# Patient Record
Sex: MALE | Race: ASIAN | NOT HISPANIC OR LATINO | URBAN - METROPOLITAN AREA
[De-identification: names, ages, dates, MRNs, and addresses within clinical notes are randomized per-mention and may not be internally consistent; named-entity substitution may affect disease eponyms.]

---

## 2019-03-08 ENCOUNTER — EMERGENCY (EMERGENCY)
Facility: HOSPITAL | Age: 69
LOS: 1 days | End: 2019-03-08
Attending: EMERGENCY MEDICINE
Payer: MEDICAID

## 2019-03-08 VITALS
TEMPERATURE: 98 F | DIASTOLIC BLOOD PRESSURE: 82 MMHG | HEART RATE: 79 BPM | OXYGEN SATURATION: 99 % | RESPIRATION RATE: 18 BRPM | SYSTOLIC BLOOD PRESSURE: 130 MMHG

## 2019-03-08 VITALS
RESPIRATION RATE: 20 BRPM | DIASTOLIC BLOOD PRESSURE: 77 MMHG | HEART RATE: 79 BPM | SYSTOLIC BLOOD PRESSURE: 117 MMHG | OXYGEN SATURATION: 100 %

## 2019-03-08 LAB
ALBUMIN SERPL ELPH-MCNC: 3.2 G/DL — LOW (ref 3.3–5)
ALP SERPL-CCNC: 110 U/L — SIGNIFICANT CHANGE UP (ref 40–120)
ALT FLD-CCNC: 116 U/L — HIGH (ref 10–45)
ANION GAP SERPL CALC-SCNC: 14 MMOL/L — SIGNIFICANT CHANGE UP (ref 5–17)
APTT BLD: 26.8 SEC — LOW (ref 27.5–36.3)
AST SERPL-CCNC: 257 U/L — HIGH (ref 10–40)
BILIRUB SERPL-MCNC: 0.5 MG/DL — SIGNIFICANT CHANGE UP (ref 0.2–1.2)
BUN SERPL-MCNC: 18 MG/DL — SIGNIFICANT CHANGE UP (ref 7–23)
CALCIUM SERPL-MCNC: 8.7 MG/DL — SIGNIFICANT CHANGE UP (ref 8.4–10.5)
CHLORIDE SERPL-SCNC: 101 MMOL/L — SIGNIFICANT CHANGE UP (ref 96–108)
CO2 SERPL-SCNC: 22 MMOL/L — SIGNIFICANT CHANGE UP (ref 22–31)
CREAT SERPL-MCNC: 0.96 MG/DL — SIGNIFICANT CHANGE UP (ref 0.5–1.3)
GAS PNL BLDV: SIGNIFICANT CHANGE UP
GAS PNL BLDV: SIGNIFICANT CHANGE UP
GLUCOSE SERPL-MCNC: 133 MG/DL — HIGH (ref 70–99)
HCT VFR BLD CALC: 34.2 % — LOW (ref 39–50)
HGB BLD-MCNC: 11.6 G/DL — LOW (ref 13–17)
INR BLD: 1.12 RATIO — SIGNIFICANT CHANGE UP (ref 0.88–1.16)
MCHC RBC-ENTMCNC: 32.4 PG — SIGNIFICANT CHANGE UP (ref 27–34)
MCHC RBC-ENTMCNC: 34 GM/DL — SIGNIFICANT CHANGE UP (ref 32–36)
MCV RBC AUTO: 95.3 FL — SIGNIFICANT CHANGE UP (ref 80–100)
PLATELET # BLD AUTO: 149 K/UL — LOW (ref 150–400)
POTASSIUM SERPL-MCNC: 3.9 MMOL/L — SIGNIFICANT CHANGE UP (ref 3.5–5.3)
POTASSIUM SERPL-SCNC: 3.9 MMOL/L — SIGNIFICANT CHANGE UP (ref 3.5–5.3)
PROT SERPL-MCNC: 6.1 G/DL — SIGNIFICANT CHANGE UP (ref 6–8.3)
PROTHROM AB SERPL-ACNC: 12.9 SEC — SIGNIFICANT CHANGE UP (ref 10–12.9)
RBC # BLD: 3.59 M/UL — LOW (ref 4.2–5.8)
RBC # FLD: 13.3 % — SIGNIFICANT CHANGE UP (ref 10.3–14.5)
SODIUM SERPL-SCNC: 137 MMOL/L — SIGNIFICANT CHANGE UP (ref 135–145)
TROPONIN T, HIGH SENSITIVITY RESULT: 8 NG/L — SIGNIFICANT CHANGE UP (ref 0–51)
TROPONIN T, HIGH SENSITIVITY RESULT: 8 NG/L — SIGNIFICANT CHANGE UP (ref 0–51)
WBC # BLD: 8.7 K/UL — SIGNIFICANT CHANGE UP (ref 3.8–10.5)
WBC # FLD AUTO: 8.7 K/UL — SIGNIFICANT CHANGE UP (ref 3.8–10.5)

## 2019-03-08 PROCEDURE — 80053 COMPREHEN METABOLIC PANEL: CPT

## 2019-03-08 PROCEDURE — 85014 HEMATOCRIT: CPT

## 2019-03-08 PROCEDURE — 82435 ASSAY OF BLOOD CHLORIDE: CPT

## 2019-03-08 PROCEDURE — 82947 ASSAY GLUCOSE BLOOD QUANT: CPT

## 2019-03-08 PROCEDURE — 84132 ASSAY OF SERUM POTASSIUM: CPT

## 2019-03-08 PROCEDURE — 85730 THROMBOPLASTIN TIME PARTIAL: CPT

## 2019-03-08 PROCEDURE — 82330 ASSAY OF CALCIUM: CPT

## 2019-03-08 PROCEDURE — 85027 COMPLETE CBC AUTOMATED: CPT

## 2019-03-08 PROCEDURE — 99285 EMERGENCY DEPT VISIT HI MDM: CPT | Mod: 25

## 2019-03-08 PROCEDURE — 71275 CT ANGIOGRAPHY CHEST: CPT | Mod: 26

## 2019-03-08 PROCEDURE — 84295 ASSAY OF SERUM SODIUM: CPT

## 2019-03-08 PROCEDURE — 83605 ASSAY OF LACTIC ACID: CPT

## 2019-03-08 PROCEDURE — 93005 ELECTROCARDIOGRAM TRACING: CPT

## 2019-03-08 PROCEDURE — 99291 CRITICAL CARE FIRST HOUR: CPT

## 2019-03-08 PROCEDURE — 85610 PROTHROMBIN TIME: CPT

## 2019-03-08 PROCEDURE — 93010 ELECTROCARDIOGRAM REPORT: CPT

## 2019-03-08 PROCEDURE — 82803 BLOOD GASES ANY COMBINATION: CPT

## 2019-03-08 PROCEDURE — 71275 CT ANGIOGRAPHY CHEST: CPT

## 2019-03-08 PROCEDURE — 71045 X-RAY EXAM CHEST 1 VIEW: CPT | Mod: 26

## 2019-03-08 PROCEDURE — 71045 X-RAY EXAM CHEST 1 VIEW: CPT

## 2019-03-08 PROCEDURE — 84484 ASSAY OF TROPONIN QUANT: CPT

## 2019-03-08 RX ORDER — SODIUM CHLORIDE 9 MG/ML
500 INJECTION, SOLUTION INTRAVENOUS ONCE
Qty: 0 | Refills: 0 | Status: COMPLETED | OUTPATIENT
Start: 2019-03-08 | End: 2019-03-08

## 2019-03-08 RX ORDER — ASPIRIN/CALCIUM CARB/MAGNESIUM 324 MG
162 TABLET ORAL ONCE
Qty: 0 | Refills: 0 | Status: COMPLETED | OUTPATIENT
Start: 2019-03-08 | End: 2019-03-08

## 2019-03-08 RX ADMIN — SODIUM CHLORIDE 500 MILLILITER(S): 9 INJECTION, SOLUTION INTRAVENOUS at 18:24

## 2019-03-08 RX ADMIN — Medication 162 MILLIGRAM(S): at 16:00

## 2019-03-08 NOTE — ED PROVIDER NOTE - CRITICAL CARE PROVIDED
consult w/ pt's family directly relating to pts condition/telephone consultation with the patient's family/interpretation of diagnostic studies/additional history taking/consultation with other physicians/conducted a detailed discussion of DNR status/direct patient care (not related to procedure)/documentation

## 2019-03-08 NOTE — ED ADULT NURSE REASSESSMENT NOTE - NS ED NURSE REASSESS COMMENT FT1
pt reassessed, vitals in flowsheet. pt states no longer has chest pain but GERD like symptoms. MD grissom notified and MD states CT shows possible cardiac tamponade, and advised pt go into room, for US.

## 2019-03-08 NOTE — ED PROVIDER NOTE - CARE PLAN
Principal Discharge DX:	Pericardial effusion  Secondary Diagnosis:	Pleural effusion  Secondary Diagnosis:	SOB (shortness of breath)  Secondary Diagnosis:	Chest pain

## 2019-03-08 NOTE — ED ADULT NURSE REASSESSMENT NOTE - NS ED NURSE REASSESS COMMENT FT1
VBG drawn from pt and MD grissom notified RN that pt is going to be transferred back to [place] where his surgery was performed.

## 2019-03-08 NOTE — ED PROVIDER NOTE - ATTENDING CONTRIBUTION TO CARE
I have seen and evaluated this patient with the resident.   I agree with the findings  unless other wise stated.  I have made appropriate changes in documentations where needed, After my face to face bedside evaluation, I am further  noting: Pt with gradual worsening sob and chest pain since CABG worse today no diaphoresis no fever no syncope Denies orthopnea s/p CABG 2 weeks ago Alert anxious mild resp distress JVD+ marked reduction in air entry Left side heart regular s1s2 abdomen soft non palpable liver pedal edema +moves all extremities  concern for pleural pericardial effusion pulmonary embolism CHF will do stabilaization oxygenation mantainance  labs trop  cxr CTA chest admission --Woo

## 2019-03-08 NOTE — ED PROVIDER NOTE - OBJECTIVE STATEMENT
68 year-old male with history of CAD with recent bypass approx. 2 weeks ago in [facility], diabetes, prior smoker presents to the Emergency Department for chest pain and shortness of breath.  Patient mention ongoing sharp chest pain since surgery; this AM pain was much worse and accompanied with shortness of breath.  + cough, + sputum production.  No fevers, chills, nausea, vomiting, diaphoresis, LE swelling   ID: [id number] 68 year-old male with history of CAD with recent bypass approx. 2 weeks ago in [facility], diabetes, prior smoker presents to the Emergency Department for chest pain and shortness of breath.  Patient mention ongoing sharp chest pain since surgery; this AM pain was much worse and accompanied with shortness of breath.  + cough, + sputum production.  No fevers, chills, nausea, vomiting, diaphoresis, LE swelling   ID: [id number]  Cardiac surgeon - Koki Guillen

## 2019-03-08 NOTE — ED PROVIDER NOTE - PHYSICAL EXAMINATION
*Gen: appears fatigued, AAO*3  *HEENT: NC/AT, MMM, airway patent, trachea midline  *CV: RRR, S1/S2 present, no murmurs/rubs/gallops  *Resp: no respiratory distress, + crackles in AMANUEL; rest lung fields CTAB  *Abd: non-distended, soft N/Tx4, no guarding or rigidity  *Neuro: no focal neuro deficits  *Extremities: no gross deformity  *Skin: no rashes, + surgical scar on central chest  ~ Payton Encinas M.D. *Gen: appears fatigued, AAO*3  *HEENT: NC/AT, MMM, airway patent, trachea midline  *CV: RRR, S1/S2 present, no murmurs/rubs/gallops  *Resp: no respiratory distress, + crackles in left lung; CTAB in right lung  *Abd: non-distended, soft N/Tx4, no guarding or rigidity  *Neuro: no focal neuro deficits  *Extremities: no gross deformity  *Skin: no rashes, + surgical scar on central chest  ~ Payton Encinas M.D.

## 2019-03-08 NOTE — ED PROVIDER NOTE - NSPREEXISTRELATION_ED_A_ED_FT
[facility] - prior CABG done at facility < 1 month ago presenting with CP and SOB found to have significant pleural effusion and pericardial effusion

## 2019-03-08 NOTE — ED PROVIDER NOTE - CLINICAL SUMMARY MEDICAL DECISION MAKING FREE TEXT BOX
Pt with gradual worsening sob and chest pain since CABG worse today no diaphoresis no fever no syncope Denies orthopnea s/p CABG 2 weeks ago Alert anxious mild resp distress JVD+ marked reduction in air entry Left side heart regular s1s2 abdomen soft non palpable liver pedal edema +moves all extremities  concern for pleural pericardial effusion pulmonary embolism CHF will do stabilaization oxygenation mantainance  labs trop  cxr CTA chest admission --Woo

## 2019-03-08 NOTE — ED PROVIDER NOTE - PROGRESS NOTE DETAILS
pt signed out to me by alexis, pending labs, cta chest, discussion with pt's ct surgeon. had contacted jena hudson with [facility] ct surgery, and was awaiting callback. I just received a call pt signed out to me by alexis, pending labs, cta chest, discussion with pt's ct surgeon. had contacted jena hudson with [facility] ct surgery, and was awaiting callback. I just received a call from [facility] regarding cta read. pt has large loculated L pleural effusion, also has large pericardial effusino with some signs of early tamponade. pt re-evaluated, rechecked vitals, without clinical findings indicating tamponade at this time. called back jena hudson with [facility] ct surgery ([phone]), he is attempting to reach ct surgery fellow as patient will require transfer to [facility]. will monitor closely. - Teryr Brasher MD pt accepted as transfer to [facility], accepting physician dr mariano. spoke to [facility] transfer center, who is working on making bed available, transfer center will call back when available. - Terry Brasher MD pt assigned room in CCU at [address] room 13.  contacting ems to arrange for transport. - Terry Brasher MD

## 2019-03-08 NOTE — ED ADULT NURSE REASSESSMENT NOTE - NS ED NURSE REASSESS COMMENT FT1
RN called [facility] transfer center and spoke with DELFIN Pace RN confirmed pt going to CCU 13 in 10 east at [facility].

## 2019-03-08 NOTE — ED ADULT NURSE NOTE - OBJECTIVE STATEMENT
Patient is a 68 y male presenting to the ED via EMS with c/o SOB and CP. Reports sudden SOB and severe chest pain that began this morning. Pt rates chest pain 8/10 states its like an "electrical pain." Chest pain does not radiate. Pt denies N/V/D. PMH DM, ACS. No allergies. After surgery, chest pain continued. Triple bypass CABG 2 weeks ago. Pt reports "severe hives" after surgery. Lipitor and medication for arrythmia Patient is a 68 y male presenting to the ED via EMS with c/o SOB and CP. Pt A&Ox4. Pt is German speaking.  used, Toan ID# [id number]. Pt reports sudden SOB and severe chest pain that began this morning. Pt rates chest pain 8/10 states its like an "electrical pain." Chest pain does not radiate. Pt denies N/V/D. PMH DM, ACS, hyperlipidemia. Pt had triple bypass CABG 2 weeks ago. After surgery, chest pain continued. Pt reports "severe hives" after surgery. EKG performed which showed NSR and placed on CM. Pt in no acute distress. Patient's family at bedside.

## 2019-03-08 NOTE — ED PROVIDER NOTE - CHIEF COMPLAINT
The patient is a 68y Male complaining of chest pain. The patient is a 68y Male complaining of chest pain. Info thro  video device --Woo

## 2019-03-12 DIAGNOSIS — Z95.1 PRESENCE OF AORTOCORONARY BYPASS GRAFT: Chronic | ICD-10-CM

## 2024-10-27 ENCOUNTER — EMERGENCY (EMERGENCY)
Facility: HOSPITAL | Age: 74
LOS: 1 days | Discharge: HOME | End: 2024-10-27
Attending: EMERGENCY MEDICINE
Payer: MEDICAID

## 2024-10-27 VITALS
OXYGEN SATURATION: 97 % | HEART RATE: 60 BPM | WEIGHT: 160.06 LBS | DIASTOLIC BLOOD PRESSURE: 76 MMHG | TEMPERATURE: 98 F | SYSTOLIC BLOOD PRESSURE: 149 MMHG | RESPIRATION RATE: 18 BRPM | HEIGHT: 66.14 IN

## 2024-10-27 VITALS
HEART RATE: 68 BPM | DIASTOLIC BLOOD PRESSURE: 74 MMHG | OXYGEN SATURATION: 100 % | SYSTOLIC BLOOD PRESSURE: 127 MMHG | RESPIRATION RATE: 16 BRPM | TEMPERATURE: 98 F

## 2024-10-27 DIAGNOSIS — Z95.1 PRESENCE OF AORTOCORONARY BYPASS GRAFT: Chronic | ICD-10-CM

## 2024-10-27 PROBLEM — E11.9 TYPE 2 DIABETES MELLITUS WITHOUT COMPLICATIONS: Chronic | Status: ACTIVE | Noted: 2019-03-08

## 2024-10-27 PROCEDURE — 30901 CONTROL OF NOSEBLEED: CPT | Mod: RT

## 2024-10-27 PROCEDURE — 99284 EMERGENCY DEPT VISIT MOD MDM: CPT | Mod: 25

## 2024-10-27 RX ORDER — SILVER NITRATE
1 CRYSTALS MISCELLANEOUS ONCE
Refills: 0 | Status: COMPLETED | OUTPATIENT
Start: 2024-10-27 | End: 2024-10-27

## 2024-10-27 RX ORDER — OXYMETAZOLINE HCL 0.05 %
2 SPRAY, NON-AEROSOL (ML) NASAL ONCE
Refills: 0 | Status: COMPLETED | OUTPATIENT
Start: 2024-10-27 | End: 2024-10-27

## 2024-10-27 RX ADMIN — Medication 2 SPRAY(S): at 12:26

## 2024-10-27 RX ADMIN — Medication 1 APPLICATION(S): at 12:27

## 2024-10-27 NOTE — ED PROVIDER NOTE - PROGRESS NOTE DETAILS
No bleeding in ED. A superficial abrasion on ant septum identified and +cauterized the abrasion. No bleeding in ED. A superficial abrasion on ant septum identified and +cauterized the abrasion. No headache or dizziness.

## 2024-10-27 NOTE — ED PROVIDER NOTE - NSFOLLOWUPINSTRUCTIONS_ED_ALL_ED_FT
Please see the information of Nosebleed and follow the instruction.    Moisturize your nostrils with Vaseline or Bacitracin ointment twice a day as instructed.    Do not blow your nose.    Keep continue your current medications as prescribed.    Follow up with ENT Dr. Sullivan for reevaluation, call Monday for appointment.    Follow up with your eye  as scheduled on Friday.     Return for any concerns, fever, fizziness, or recurrent bleeding.

## 2024-10-27 NOTE — ED PROVIDER NOTE - PATIENT PORTAL LINK FT
You can access the FollowMyHealth Patient Portal offered by [facility] by registering at the following website: [email or web address]. By joining [facility]s FollowMyHealth portal, you will also be able to view your health information using other applications (apps) compatible with our system.

## 2024-10-27 NOTE — ED PROVIDER NOTE - CARE PROVIDER_API CALL
Radha Sullivan  Otolaryngology  [address] [place] [address], [address]  [place] [address]  Phone: [phone]  Fax: [phone]  Follow Up Time:

## 2024-10-27 NOTE — ED PROVIDER NOTE - CLINICAL SUMMARY MEDICAL DECISION MAKING FREE TEXT BOX
Kolby: 74 year old male with bleeding from right nare. bleeding stopped. PE: alert, nad, nonlabord respirations + left eye patch, oropharynx wnl, nonlabored respairtions, + s1s2, R small end capillary visualized on R nasal septum. nonlabored respirations, + s1s2, alert and oriented x 3. normal gait.  will apply afrin and then cautery with silver nitrate. epistaxis resolved at this time. will dc home.

## 2024-10-27 NOTE — ED ADULT NURSE NOTE - OBJECTIVE STATEMENT
74 y.o. male coming in from home via EMS for epistaxis. pt states that at 08:00 this AM pt blew his nose and his nose started bleeding, at around the same time he said he started to get a headache. PMH HTN, left cataract surgery from Monday. A&Ox3, vss, pt endorsing headache, endorsing dizziness with standing up quickly, pt denies CP/SOB/weakness/fevers/chills/N/V/D/urinary symptoms,

## 2024-10-27 NOTE — ED PROVIDER NOTE - OBJECTIVE STATEMENT
74  year old male Telugu speaking male here with R nare bleeding. Patient states woke up this morning and blew his nose and started bleeding. had left eye surgery done 6 days ago. takes baby asa but stopped it for eye surgery. no pain. did not take bp meds this am. reports no digital trauma to nare.  : [id number]  Nani